# Patient Record
Sex: MALE | Race: OTHER | Employment: FULL TIME | URBAN - NONMETROPOLITAN AREA
[De-identification: names, ages, dates, MRNs, and addresses within clinical notes are randomized per-mention and may not be internally consistent; named-entity substitution may affect disease eponyms.]

---

## 2024-09-05 ENCOUNTER — HOSPITAL ENCOUNTER (EMERGENCY)
Facility: HOSPITAL | Age: 34
Discharge: HOME | End: 2024-09-05
Attending: EMERGENCY MEDICINE

## 2024-09-05 VITALS
HEART RATE: 53 BPM | OXYGEN SATURATION: 97 % | RESPIRATION RATE: 18 BRPM | DIASTOLIC BLOOD PRESSURE: 70 MMHG | SYSTOLIC BLOOD PRESSURE: 151 MMHG | TEMPERATURE: 97.5 F

## 2024-09-05 DIAGNOSIS — Z20.2 EXPOSURE TO CHLAMYDIA: Primary | ICD-10-CM

## 2024-09-05 LAB
BACTERIA UR QL AUTO: ABNORMAL /HPF
BILIRUB UR QL STRIP: NEGATIVE
CLARITY UR: CLEAR
COLOR UR: YELLOW
GLUCOSE UR STRIP-MCNC: NEGATIVE MG/DL
HGB UR QL STRIP.AUTO: NEGATIVE
KETONES UR STRIP-MCNC: NEGATIVE MG/DL
LEUKOCYTE ESTERASE UR QL STRIP: ABNORMAL
NITRITE UR QL STRIP: NEGATIVE
NON-SQ EPI CELLS URNS QL MICRO: ABNORMAL /HPF
PH UR STRIP.AUTO: 7.5 [PH]
PROT UR STRIP-MCNC: NEGATIVE MG/DL
RBC #/AREA URNS AUTO: ABNORMAL /HPF
SP GR UR STRIP.AUTO: 1.02 (ref 1–1.03)
TRANS CELLS #/AREA URNS HPF: ABNORMAL /[HPF]
UROBILINOGEN UR STRIP-ACNC: <2 MG/DL
WBC #/AREA URNS AUTO: ABNORMAL /HPF
WBC CLUMPS # UR AUTO: ABNORMAL /UL

## 2024-09-05 PROCEDURE — 99283 EMERGENCY DEPT VISIT LOW MDM: CPT

## 2024-09-05 PROCEDURE — 87591 N.GONORRHOEAE DNA AMP PROB: CPT | Performed by: EMERGENCY MEDICINE

## 2024-09-05 PROCEDURE — 96372 THER/PROPH/DIAG INJ SC/IM: CPT

## 2024-09-05 PROCEDURE — 87661 TRICHOMONAS VAGINALIS AMPLIF: CPT | Performed by: EMERGENCY MEDICINE

## 2024-09-05 PROCEDURE — 87563 M. GENITALIUM AMP PROBE: CPT | Performed by: EMERGENCY MEDICINE

## 2024-09-05 PROCEDURE — 99284 EMERGENCY DEPT VISIT MOD MDM: CPT | Performed by: EMERGENCY MEDICINE

## 2024-09-05 PROCEDURE — 87086 URINE CULTURE/COLONY COUNT: CPT | Performed by: EMERGENCY MEDICINE

## 2024-09-05 PROCEDURE — 87491 CHLMYD TRACH DNA AMP PROBE: CPT | Performed by: EMERGENCY MEDICINE

## 2024-09-05 PROCEDURE — 81001 URINALYSIS AUTO W/SCOPE: CPT | Performed by: EMERGENCY MEDICINE

## 2024-09-05 RX ORDER — AZITHROMYCIN 250 MG/1
1000 TABLET, FILM COATED ORAL ONCE
Status: COMPLETED | OUTPATIENT
Start: 2024-09-05 | End: 2024-09-05

## 2024-09-05 RX ADMIN — AZITHROMYCIN DIHYDRATE 1000 MG: 250 TABLET ORAL at 16:10

## 2024-09-05 RX ADMIN — LIDOCAINE HYDROCHLORIDE 500 MG: 10 INJECTION, SOLUTION EPIDURAL; INFILTRATION; INTRACAUDAL; PERINEURAL at 16:10

## 2024-09-05 NOTE — ED PROVIDER NOTES
History  Chief Complaint   Patient presents with    Exposure to STD     States his girlfriend tested positive for chlamydia.  And would like to get tested      HPI  This is a 34-year-old male presents the emergency department for evaluation of possible STD exposure.  Patient is asymptomatic.  He reports that his girlfriend recently was positive for urine chlamydia testing and was called yesterday by phone and made aware to return for treatment.  Patient presents seeking urine testing and is agreeable to empiric treatment.  Denies any drug allergies to deny any prior STD history.  Prior to Admission Medications   Prescriptions Last Dose Informant Patient Reported? Taking?   cyclobenzaprine (FLEXERIL) 5 mg tablet   Yes No   naproxen (Naprosyn) 500 mg tablet   No No   Sig: Take 1 tablet (500 mg total) by mouth 2 (two) times a day with meals   predniSONE 10 mg tablet   Yes No   Sig: TAKE 5 TABLETS BY MOUTH DAILY FOR 2 DAYS, 4 TABLETS DAILY FOR 2 DAYS, 3 TABLETS DAILY FOR 2 DAYS, 2 TABLETS DAILY FOR 2 DAYS AND THEN 1 TABLET DAILY FOR 2 DAYS   Patient not taking: Reported on 7/7/2023   traMADol (ULTRAM) 50 mg tablet   Yes No   Sig: TAKE 1 TO 2 TABLETS BY MOUTH TWICE DAILY AS NEEDED FOR SEVERE PAIN (DO NOT TAKE BEFORE DRIVING OR DURING WORK)   Patient not taking: Reported on 7/7/2023      Facility-Administered Medications: None       History reviewed. No pertinent past medical history.    History reviewed. No pertinent surgical history.    History reviewed. No pertinent family history.  I have reviewed and agree with the history as documented.    E-Cigarette/Vaping    E-Cigarette Use Never User      E-Cigarette/Vaping Substances     Social History     Tobacco Use    Smoking status: Every Day     Current packs/day: 0.25     Types: Cigarettes     Passive exposure: Never    Smokeless tobacco: Never   Vaping Use    Vaping status: Never Used   Substance Use Topics    Alcohol use: Yes     Comment: once a month    Drug use: Never        Review of Systems   Constitutional:  Negative for chills and fever.   HENT:  Negative for ear pain and sore throat.    Eyes:  Negative for pain and visual disturbance.   Respiratory:  Negative for cough and shortness of breath.    Cardiovascular:  Negative for chest pain and palpitations.   Gastrointestinal:  Negative for abdominal pain and vomiting.   Genitourinary:  Negative for difficulty urinating, dysuria, flank pain, frequency, genital sores, hematuria, penile discharge, penile pain, penile swelling, scrotal swelling and testicular pain.   Musculoskeletal:  Negative for arthralgias and back pain.   Skin:  Negative for color change and rash.   Neurological:  Negative for seizures and syncope.   All other systems reviewed and are negative.      Physical Exam  Physical Exam  Vitals and nursing note reviewed.   Constitutional:       General: He is not in acute distress.     Appearance: Normal appearance. He is well-developed.   HENT:      Head: Normocephalic and atraumatic.      Right Ear: External ear normal.      Left Ear: External ear normal.      Nose: Nose normal.   Eyes:      Conjunctiva/sclera: Conjunctivae normal.   Cardiovascular:      Rate and Rhythm: Normal rate and regular rhythm.      Heart sounds: No murmur heard.  Pulmonary:      Effort: Pulmonary effort is normal. No respiratory distress.   Abdominal:      General: Abdomen is flat. There is no distension.   Musculoskeletal:      Cervical back: Neck supple.      Right lower leg: No edema.      Left lower leg: No edema.   Skin:     General: Skin is dry.   Neurological:      General: No focal deficit present.      Mental Status: He is alert.   Psychiatric:         Mood and Affect: Mood normal.         Behavior: Behavior normal.         Vital Signs  ED Triage Vitals [09/05/24 1508]   Temperature Pulse Respirations Blood Pressure SpO2   97.5 °F (36.4 °C) (!) 53 18 151/70 97 %      Temp Source Heart Rate Source Patient Position - Orthostatic VS BP  Location FiO2 (%)   Temporal Monitor Sitting Right arm --      Pain Score       No Pain           Vitals:    09/05/24 1508   BP: 151/70   Pulse: (!) 53   Patient Position - Orthostatic VS: Sitting         Visual Acuity      ED Medications  Medications   azithromycin (ZITHROMAX) tablet 1,000 mg (1,000 mg Oral Given 9/5/24 1610)   cefTRIAXone (ROCEPHIN) 500 mg in lidocaine (PF) (XYLOCAINE-MPF) 1 % IM only syringe (500 mg Intramuscular Given 9/5/24 1610)       Diagnostic Studies  Results Reviewed       Procedure Component Value Units Date/Time    Urine Microscopic [[id number]]  (Abnormal) Collected: 09/05/24 1518    Lab Status: Final result Specimen: Urine, Clean Catch Updated: 09/05/24 1555     RBC, UA 0-1 /hpf      WBC, UA 10-20 /hpf      Epithelial Cells Occasional /hpf      Bacteria, UA Occasional /hpf      WBC Clumps an ocassional small wbc clump observed     Transitional Epithelial Cells ocassionally observed    Urine culture [[id number]] Collected: 09/05/24 1518    Lab Status: In process Specimen: Urine, Clean Catch Updated: 09/05/24 1555    UA w Reflex to Microscopic w Reflex to Culture [[id number]]  (Abnormal) Collected: 09/05/24 1518    Lab Status: Final result Specimen: Urine, Clean Catch Updated: 09/05/24 1529     Color, UA Yellow     Clarity, UA Clear     Specific Gravity, UA 1.020     pH, UA 7.5     Leukocytes, UA Small     Nitrite, UA Negative     Protein, UA Negative mg/dl      Glucose, UA Negative mg/dl      Ketones, UA Negative mg/dl      Urobilinogen, UA <2.0 mg/dl      Bilirubin, UA Negative     Occult Blood, UA Negative    Trichomonas vaginalis/Mycoplasma genitalium PCR [[id number]] Collected: 09/05/24 1520    Lab Status: In process Specimen: Urine, Voided Updated: 09/05/24 1524    Chlamydia/GC amplified DNA by PCR [[id number]] Collected: 09/05/24 1518    Lab Status: In process Specimen: Urine, Other Updated: 09/05/24 1524                   No orders to display              Procedures  Procedures          ED Course  ED Course as of 09/05/24 1613   Thu Sep 05, 2024   1555 WBC, UA(!): 10-20   1555 Leukocytes, UA(!): Small                                 SBIRT 20yo+      Flowsheet Row Most Recent Value   Initial Alcohol Screen: US AUDIT-C     1. How often do you have a drink containing alcohol? 0 Filed at: 09/05/2024 1509   2. How many drinks containing alcohol do you have on a typical day you are drinking?  0 Filed at: 09/05/2024 1509   3a. Male UNDER 65: How often do you have five or more drinks on one occasion? 0 Filed at: 09/05/2024 1509   Audit-C Score 0 Filed at: 09/05/2024 1509   JESSICA: How many times in the past year have you...    Used an illegal drug or used a prescription medication for non-medical reasons? Never Filed at: 09/05/2024 1509                      Medical Decision Making  34-year-old male presenting for testing after sexual partner/girlfriend testing positive for chlamydia.  Asymptomatic.  Discussed empiric treatment given history of unprotected sex with a person recently testing positive for chlamydia, patient is agreeable discussed empiric treatment with Rocephin and oral azithromycin.  No allergies.  Will send urine testing for confirmation.  Patient given instructions for abstinence x 1 week, recommendations for repeat testing in 3 months.  Agreeable to plan will discharge.    Problems Addressed:  Exposure to chlamydia: acute illness or injury    Amount and/or Complexity of Data Reviewed  Labs: ordered. Decision-making details documented in ED Course.    Risk  Prescription drug management.                 Disposition  Final diagnoses:   Exposure to chlamydia     Time reflects when diagnosis was documented in both MDM as applicable and the Disposition within this note       Time User Action Codes Description Comment    9/5/2024  3:16 PM Sin Lerma Add [Z20.2] STD exposure     9/5/2024  3:16 PM Sin Lerma Remove [Z20.2] STD exposure     9/5/2024  3:16 PM Maria Guadalupe  Sin Add [Z20.2] Exposure to chlamydia           ED Disposition       ED Disposition   Discharge    Condition   Stable    Date/Time   Thu Sep 5, 2024 1516    Comment   Landry Siegel discharge to home/self care.                   Follow-up Information    None         Patient's Medications   Discharge Prescriptions    No medications on file       No discharge procedures on file.    PDMP Review       None            ED Provider  Electronically Signed by             Sin Lerma,   09/05/24 1524       Sin Lerma,   09/05/24 1619

## 2024-09-05 NOTE — DISCHARGE INSTRUCTIONS
Thank you for visiting the Emergency Department today.    You were treated with 500mg IM Ceftriaxone and 1000mg Oral Azithromycin  This should adequately treat possible chlamydia infection; no furthe rtreatment is needed at this time.     Recommend no sex for 7 days or while on treatment.   Recommend re-testing at 3months  Return if any concerns    We have sent urine testing for confirmation, results in 1-3 days. Will be called by phone of any positive results.

## 2024-09-06 LAB
BACTERIA UR CULT: NORMAL
C TRACH DNA SPEC QL NAA+PROBE: POSITIVE
N GONORRHOEA DNA SPEC QL NAA+PROBE: NEGATIVE

## 2024-09-07 LAB
M GENITALIUM DNA SPEC QL NAA+PROBE: NEGATIVE
T VAGINALIS DNA SPEC QL NAA+PROBE: NEGATIVE